# Patient Record
Sex: MALE | URBAN - METROPOLITAN AREA
[De-identification: names, ages, dates, MRNs, and addresses within clinical notes are randomized per-mention and may not be internally consistent; named-entity substitution may affect disease eponyms.]

---

## 2020-07-14 ENCOUNTER — APPOINTMENT (RX ONLY)
Dept: URBAN - METROPOLITAN AREA CLINIC 111 | Facility: CLINIC | Age: 70
Setting detail: DERMATOLOGY
End: 2020-07-14

## 2020-07-14 DIAGNOSIS — L40.0 PSORIASIS VULGARIS: ICD-10-CM

## 2020-07-14 PROBLEM — D48.5 NEOPLASM OF UNCERTAIN BEHAVIOR OF SKIN: Status: ACTIVE | Noted: 2020-07-14

## 2020-07-14 PROCEDURE — 69100 BIOPSY OF EXTERNAL EAR: CPT

## 2020-07-14 PROCEDURE — ? COUNSELING

## 2020-07-14 PROCEDURE — ? TREATMENT REGIMEN

## 2020-07-14 PROCEDURE — ? PRESCRIPTION

## 2020-07-14 PROCEDURE — 99202 OFFICE O/P NEW SF 15 MIN: CPT | Mod: 25

## 2020-07-14 PROCEDURE — ? BIOPSY BY SHAVE METHOD

## 2020-07-14 RX ORDER — KETOCONAZOLE 20 MG/ML
SHAMPOO TOPICAL
Qty: 1 | Refills: 11 | Status: ERX | COMMUNITY
Start: 2020-07-14

## 2020-07-14 RX ORDER — HYDROCORTISONE 25 MG/G
CREAM TOPICAL
Qty: 1 | Refills: 2 | Status: ERX | COMMUNITY
Start: 2020-07-14

## 2020-07-14 RX ORDER — CLOBETASOL PROPIONATE 0.5 MG/G
CREAM TOPICAL
Qty: 1 | Refills: 2 | Status: ERX | COMMUNITY
Start: 2020-07-14

## 2020-07-14 RX ADMIN — KETOCONAZOLE: 20 SHAMPOO TOPICAL at 00:00

## 2020-07-14 RX ADMIN — HYDROCORTISONE: 25 CREAM TOPICAL at 00:00

## 2020-07-14 RX ADMIN — CLOBETASOL PROPIONATE: 0.5 CREAM TOPICAL at 00:00

## 2020-07-14 ASSESSMENT — LOCATION DETAILED DESCRIPTION DERM
LOCATION DETAILED: GLABELLA
LOCATION DETAILED: MID-OCCIPITAL SCALP
LOCATION DETAILED: LEFT INFERIOR MEDIAL MIDBACK
LOCATION DETAILED: RIGHT BUTTOCK
LOCATION DETAILED: LEFT BUTTOCK

## 2020-07-14 ASSESSMENT — LOCATION SIMPLE DESCRIPTION DERM
LOCATION SIMPLE: GLABELLA
LOCATION SIMPLE: LEFT BUTTOCK
LOCATION SIMPLE: POSTERIOR SCALP
LOCATION SIMPLE: LEFT LOWER BACK
LOCATION SIMPLE: RIGHT BUTTOCK

## 2020-07-14 ASSESSMENT — LOCATION ZONE DERM
LOCATION ZONE: SCALP
LOCATION ZONE: TRUNK
LOCATION ZONE: FACE

## 2020-07-14 NOTE — PROCEDURE: BIOPSY BY SHAVE METHOD
Detail Level: Detailed
Depth Of Biopsy: dermis
Was A Bandage Applied: Yes
Size Of Lesion In Cm: 0.6
X Size Of Lesion In Cm: 0
Biopsy Type: H and E
Biopsy Method: Dermablade
Anesthesia Type: 1% lidocaine with epinephrine
Anesthesia Volume In Cc (Will Not Render If 0): 0.5
Hemostasis: Drysol
Wound Care: Polysporin ointment
Dressing: bandage
Destruction After The Procedure: No
Type Of Destruction Used: Curettage
Curettage Text: The wound bed was treated with curettage after the biopsy was performed.
Cryotherapy Text: The wound bed was treated with cryotherapy after the biopsy was performed.
Electrodesiccation Text: The wound bed was treated with electrodesiccation after the biopsy was performed.
Electrodesiccation And Curettage Text: The wound bed was treated with electrodesiccation and curettage after the biopsy was performed.
Silver Nitrate Text: The wound bed was treated with silver nitrate after the biopsy was performed.
Lab: [id number]
Lab Facility: [id number]
Consent: Verbal consent was obtained and risks were reviewed including but not limited to scarring, infection, bleeding, scabbing, incomplete removal, nerve damage and allergy to anesthesia.
Post-Care Instructions: CARE INSTRUCTIONS: SHAVE BIOPSY \\nThis information is also available on our website: [email or web address]\\nWound Site Care\\n    • Keep wound dry today and remove bandage in 24 hours. \\n    • Shower normally, allowing soap and water to run across the wound, but do not scrub. Antibacterial soaps, such as Dial, may be used daily. \\n    • Keep wound moist with application of Polysporin ointment or Vaseline 1-2 times daily. (If you have itching or irritation from topical antibiotics like Polysporin, then switch to plain Vaseline.) Keeping the wound moist reduces infection, minimizes scarring, and prevents crust formation over the wound. \\n    • Cover with clean bandage daily. If you have steri-strips (small white bandages) across the wound, leave intact. These typically fall off within a few days. \\n    • Avoid picking at the wound site, which increases risk of infection and scarring. \\n    • If wound is near the eyes, cold compresses may be used for 20 minutes each hour to minimize pain, swelling, and bruising. Puffiness under the eyes is possible for a few days after the procedure. \\n    • For any wound discomfort, you may take Tylenol (or acetaminophen). Adults may take 500-1000 mg every six hours (if you are able to take Tylenol). Or use cold compresses for up to 20 minutes hourly as needed. \\n    • Allow several weeks for wound to fully heal. Temporary discoloration at the wound site is normal and may take several months to fade.\\nIf You Have Bleeding\\n    • Hold firm pressure for 20 minutes, without lifting the pressure to look at wound. Repeat as needed. \\n    • If bleeding does not stop, apply ice compresses and call our office (or report to the nearest ER.)\\nActivities to Avoid\\nIf your wound is large or has sutures, then until sutures are removed you should:\\n    • Avoid contaminated water (lake, river, or ocean); use waterproof bandage in chlorinated pool. \\n    • If sutures are present, minimize activity that produces stress to excision site (heavy lifting or exercise that pulls at the wound, such as lunges for thigh wounds or golf for upper back wounds)\\nWatch for Infection\\n    • Slight redness, initial tenderness, and clear yellow discharge are normal. \\n    • Call the office if you have signs of infection, such as increased tenderness, warmth, spreading redness, swelling, or thick white-to-yellow discharge. \\n    • Seek urgent medical attention for severe infection, with fever, chills, nausea, or pain radiating from the wound to surrounding tissue.\\nNext Steps\\n    • If you have sutures, return for suture removal in 5-14 days, as directed by physician, but call sooner if you are concerned about your wound. \\n    • Pathology results will be given by telephone in 7-10 days. Sometimes results may take longer depending on if special stains or testing is needed. If you have not received results after 14 days it is important that you call our office and let us know. \\n    • Return to clinic if you notice any new or changing moles, or if physician recommended regular skin exams.\\nLaboratory Billing Information\\n    • Your specimen has been sent for laboratory testing. The lab our office typically uses is [facility], if you have any questions about your bill.\\n    • If you have insurance coverage, we have forwarded your insurance details to the lab. If a balance is owed once the claim has been processed, you will receive an invoice from the laboratory directly
Notification Instructions: Patient will be notified of biopsy results. However, patient instructed to call the office if not contacted within 2 weeks.
Billing Type: Third-Party Bill
Information: Selecting Yes will display possible errors in your note based on the variables you have selected. This validation is only offered as a suggestion for you. PLEASE NOTE THAT THE VALIDATION TEXT WILL BE REMOVED WHEN YOU FINALIZE YOUR NOTE. IF YOU WANT TO FAX A PRELIMINARY NOTE YOU WILL NEED TO TOGGLE THIS TO 'NO' IF YOU DO NOT WANT IT IN YOUR FAXED NOTE.

## 2020-07-14 NOTE — HPI: RASH
What Type Of Note Output Would You Prefer (Optional)?: Bullet Format
How Severe Is Your Rash?: moderate
Is This A New Presentation, Or A Follow-Up?: Rash
Additional History: Patient thinks this is psoriasis but has never been diagnosed for it. Patient has used topical steroids and unsure whether they helped.

## 2020-10-13 ENCOUNTER — RX ONLY (OUTPATIENT)
Age: 70
Setting detail: RX ONLY
End: 2020-10-13

## 2020-10-13 ENCOUNTER — APPOINTMENT (RX ONLY)
Dept: URBAN - METROPOLITAN AREA CLINIC 111 | Facility: CLINIC | Age: 70
Setting detail: DERMATOLOGY
End: 2020-10-13

## 2020-10-13 DIAGNOSIS — L40.0 PSORIASIS VULGARIS: ICD-10-CM | Status: IMPROVED

## 2020-10-13 PROCEDURE — ? TREATMENT REGIMEN

## 2020-10-13 PROCEDURE — ? ADDITIONAL NOTES

## 2020-10-13 PROCEDURE — 99213 OFFICE O/P EST LOW 20 MIN: CPT

## 2020-10-13 PROCEDURE — ? COUNSELING

## 2020-10-13 RX ORDER — KETOCONAZOLE 20 MG/ML
SHAMPOO TOPICAL
Qty: 1 | Refills: 11 | Status: ERX

## 2020-10-13 ASSESSMENT — LOCATION ZONE DERM
LOCATION ZONE: TRUNK
LOCATION ZONE: SCALP
LOCATION ZONE: FACE

## 2020-10-13 ASSESSMENT — LOCATION DETAILED DESCRIPTION DERM
LOCATION DETAILED: LEFT BUTTOCK
LOCATION DETAILED: GLABELLA
LOCATION DETAILED: LEFT INFERIOR MEDIAL MIDBACK
LOCATION DETAILED: RIGHT BUTTOCK
LOCATION DETAILED: MID-OCCIPITAL SCALP

## 2020-10-13 ASSESSMENT — LOCATION SIMPLE DESCRIPTION DERM
LOCATION SIMPLE: RIGHT BUTTOCK
LOCATION SIMPLE: LEFT LOWER BACK
LOCATION SIMPLE: POSTERIOR SCALP
LOCATION SIMPLE: GLABELLA
LOCATION SIMPLE: LEFT BUTTOCK

## 2020-10-13 NOTE — PROCEDURE: ADDITIONAL NOTES
Additional Notes: Discussed pursuing more aggressive forms of treatment such as phototherapy, IMK, and biologics. Patient does not wish to pursue internal medications going forward, but plan to reevaluate at follow up.
Detail Level: Simple

## 2021-02-25 ENCOUNTER — APPOINTMENT (RX ONLY)
Dept: URBAN - METROPOLITAN AREA CLINIC 111 | Facility: CLINIC | Age: 71
Setting detail: DERMATOLOGY
End: 2021-02-25

## 2021-02-25 DIAGNOSIS — L40.0 PSORIASIS VULGARIS: ICD-10-CM | Status: INADEQUATELY CONTROLLED

## 2021-02-25 DIAGNOSIS — L40.8 OTHER PSORIASIS: ICD-10-CM | Status: INADEQUATELY CONTROLLED

## 2021-02-25 PROCEDURE — ? PRESCRIPTION

## 2021-02-25 PROCEDURE — ? PRESCRIPTION MEDICATION MANAGEMENT

## 2021-02-25 PROCEDURE — ? COUNSELING

## 2021-02-25 PROCEDURE — 99214 OFFICE O/P EST MOD 30 MIN: CPT

## 2021-02-25 RX ORDER — HYDROCORTISONE 25 MG/G
CREAM TOPICAL
Qty: 1 | Refills: 1 | Status: ERX

## 2021-02-25 RX ORDER — CICLOPIROX 10 MG/.96ML
SHAMPOO TOPICAL
Qty: 1 | Refills: 11 | Status: ERX | COMMUNITY
Start: 2021-02-25

## 2021-02-25 RX ORDER — CLOBETASOL PROPIONATE 0.5 MG/ML
SOLUTION TOPICAL
Qty: 1 | Refills: 1 | Status: ERX | COMMUNITY
Start: 2021-02-25

## 2021-02-25 RX ORDER — CLOBETASOL PROPIONATE 0.5 MG/G
CREAM TOPICAL
Qty: 1 | Refills: 1 | Status: ERX

## 2021-02-25 RX ADMIN — CICLOPIROX: 10 SHAMPOO TOPICAL at 00:00

## 2021-02-25 RX ADMIN — CLOBETASOL PROPIONATE: 0.5 SOLUTION TOPICAL at 00:00

## 2021-02-25 ASSESSMENT — LOCATION ZONE DERM
LOCATION ZONE: SCALP
LOCATION ZONE: TRUNK
LOCATION ZONE: FACE
LOCATION ZONE: EAR

## 2021-02-25 ASSESSMENT — LOCATION SIMPLE DESCRIPTION DERM
LOCATION SIMPLE: LEFT LOWER BACK
LOCATION SIMPLE: LEFT BUTTOCK
LOCATION SIMPLE: RIGHT EAR
LOCATION SIMPLE: RIGHT EYEBROW
LOCATION SIMPLE: LEFT EYEBROW
LOCATION SIMPLE: LEFT EAR
LOCATION SIMPLE: RIGHT BUTTOCK
LOCATION SIMPLE: SCALP

## 2021-02-25 ASSESSMENT — LOCATION DETAILED DESCRIPTION DERM
LOCATION DETAILED: RIGHT SUPERIOR CRUS OF ANTIHELIX
LOCATION DETAILED: LEFT SUPERIOR PARIETAL SCALP
LOCATION DETAILED: RIGHT CENTRAL EYEBROW
LOCATION DETAILED: LEFT SUPERIOR CRUS OF ANTIHELIX
LOCATION DETAILED: RIGHT BUTTOCK
LOCATION DETAILED: LEFT BUTTOCK
LOCATION DETAILED: LEFT INFERIOR MEDIAL MIDBACK
LOCATION DETAILED: LEFT CENTRAL EYEBROW

## 2021-02-25 NOTE — PROCEDURE: PRESCRIPTION MEDICATION MANAGEMENT
Initiate Treatment: -\\nClobetasol 0.05% cream: Apply to affected areas of body twice daily for 2 weeks on 1 week off. Repeat as needed for itching or flares. Avoid face/armpits/groin.
Render In Strict Bullet Format?: Yes
Detail Level: Zone
Continue Regimen: -\\n-Ciclopirox 1% Shampoo: Use as scalp/eyebrow/ear wash every day until improved and let sit for 5 minutes prior to rinsing. Then 2-3 times weekly for maintenance. \\n-Neutrogena T-Sal Shampoo: Alternate using with Ciclopirox shampoo \\n-Clobetasol 0.05% Scalp Solution: Apply to affected areas of scalp twice daily for 2 weeks on 1 week off. Repeat as needed for itching or flares. Avoid face/armpits/groin.\\n-Hydrocortisone 2.5% cream: Apply to affected areas of face twice daily for 2 weeks on 1 week off. Repeat as needed for itching or flares.